# Patient Record
Sex: FEMALE | Race: WHITE
[De-identification: names, ages, dates, MRNs, and addresses within clinical notes are randomized per-mention and may not be internally consistent; named-entity substitution may affect disease eponyms.]

---

## 2020-03-23 ENCOUNTER — HOSPITAL ENCOUNTER (OUTPATIENT)
Dept: HOSPITAL 75 - LAB FS | Age: 55
End: 2020-03-23
Attending: NURSE PRACTITIONER
Payer: COMMERCIAL

## 2020-03-23 DIAGNOSIS — R10.31: Primary | ICD-10-CM

## 2020-03-23 LAB
BUN/CREAT SERPL: 11
CREAT SERPL-MCNC: 0.88 MG/DL (ref 0.6–1.3)
GFR SERPLBLD BASED ON 1.73 SQ M-ARVRAT: > 60 ML/MIN

## 2020-03-23 PROCEDURE — 74177 CT ABD & PELVIS W/CONTRAST: CPT

## 2020-03-23 PROCEDURE — 84520 ASSAY OF UREA NITROGEN: CPT

## 2020-03-23 PROCEDURE — 36415 COLL VENOUS BLD VENIPUNCTURE: CPT

## 2020-03-23 PROCEDURE — 82565 ASSAY OF CREATININE: CPT

## 2020-03-23 NOTE — DIAGNOSTIC IMAGING REPORT
EXAMINATION: CT Abdomen and Pelvis with intravenous contrast.



TECHNIQUE: Multiple contiguous axial images were obtained through

the abdomen and pelvis after the uneventful administration of

intravenous contrast. All CT scans use one or more of the

following dose optimizing techniques: automated exposure control,

MA and/or KvP adjustment based on a patient size and exam type,

or iterative reconstruction. 



HISTORY: Right lower quadrant pain



COMPARISON: None available.



FINDINGS: 



Limited views of the lower thorax are unremarkable.



The liver is normal without focal lesion. There is no biliary

ductal dilation. Gallbladder is normal.  Pancreas is normal. 

Spleen is normal. Adrenal glands are normal.



The kidneys are normal. There is no hydronephrosis. Urinary

bladder is normal. There is thickening and irregularity of the

endometrium with widening of the cervix concerning for a cervical

or an endometrial mass. There are several myometrial fibroids.



There is acute appendicitis. The appendix is dilated measuring 12

mm. There is periappendiceal stranding. No evidence for rupture.

No abscess. No free air is seen. Remaining bowel is normal in

caliber. No free fluid or air. No abdominal or pelvic

lymphadenopathy. Aorta is normal in caliber without aneurysm.



There are no suspicious osseus lesions.



IMPRESSION:



1. Acute appendicitis without rupture or abscess.



2. Endometrial thickening within the cervical soft tissue

concerning for cervical or endometrial malignancy. Gynecologic

evaluation is recommended.



Critical findings communicated to Trish Fowler by Dr. Mcbride on

03/23/2020 at 4:38 PM.



Dictated by: 



  Dictated on workstation # [id number]